# Patient Record
Sex: FEMALE | Race: WHITE
[De-identification: names, ages, dates, MRNs, and addresses within clinical notes are randomized per-mention and may not be internally consistent; named-entity substitution may affect disease eponyms.]

---

## 2018-01-24 ENCOUNTER — HOSPITAL ENCOUNTER (OUTPATIENT)
Dept: HOSPITAL 17 - CPRE | Age: 58
End: 2018-01-24
Attending: NEUROLOGICAL SURGERY
Payer: COMMERCIAL

## 2018-01-24 DIAGNOSIS — M43.16: ICD-10-CM

## 2018-01-24 DIAGNOSIS — Z01.812: Primary | ICD-10-CM

## 2018-01-24 DIAGNOSIS — M48.062: ICD-10-CM

## 2018-01-24 PROCEDURE — 87640 STAPH A DNA AMP PROBE: CPT

## 2018-01-24 PROCEDURE — 87641 MR-STAPH DNA AMP PROBE: CPT

## 2018-01-30 NOTE — MH
cc:

QUINCY NOBLES M.D., ROHIT K. M.D.

****

 

DATE OF ADMISSION:  2018

 

ADMITTING DIAGNOSIS:

Lumbar degenerative disk disease.

 

HISTORY OF PRESENT ILLNESS

This is a 57-year-old female who presented to us for evaluation of posterior

right thigh pain that started in the summer of  and low back pain.  She

states that she saw her primary care physician and felt that she had piriformis

syndrome and recommended exercises.  She states that she got worse and switched

primary care physician's and the new primary care physician recommended

physical therapy.  She was referred to physical therapy.  She started doing

exercises but this exacerbated her pain and her physical therapist recommended

that she follow up with her primary care physician, who felt that she had more

than just piriformis syndrome.

 

Her primary care physician at the time ordered an MRI to further evaluate her

complaints.  She was referred to neurosurgery for further evaluation.

 

She has intermittent numbness in the right leg in the same distribution as the

pain.  She gets cramps at night in her lower extremities.

 

She states that she is starting to have left hip pain and a similar type of

pain in the left leg.  She has chronic low back pain.  She denies any weakness

in the lower extremities.

 

She states when she is sitting or laying down the pain is 0/10.  When she is

more active during the day her pain reaches 7/10.

 

She states her pain is typically progressive and gets worse during the day.

 

PAST MEDICAL HISTORY:

1. Hypertension.

2. Hyperlipidemia.

3. Tonsillectomy in .

4. Uterine surgery in .

5. Left ovary removed in .

 

FAMILY HISTORY:

Father is  at 74 years old, had a history of diabetes and heart

disease.  He had a heart attack.  Mother  at 75 years old, had

hypertension.  Brother is alive at 61 years old, has hypertension.  Sister is

alive at 62 years old, has hypertension.

 

SOCIAL HISTORY:

She cleans houses.  She is .  She has one child.  She lives alone. She

does not smoke and quit in 2015.  She drinks 2-3 drinks of alcohol per

year.

 

CURRENT MEDICATIONS:

1. Tumeric 500 mg daily.

2. Aspirin 81 milligrams p.o. daily.  This was placed on hold prior to surgical

intervention.

3. Zolpidem 5 milligrams p.o. q hs.

4. Amitriptyline 25 milligrams p.o. q hs.

5. Losartan 100 milligrams p.o. daily

6. Pravastatin 20 milligrams p.o. daily.

7. Gabapentin 300 milligrams p.o. b.i.d.

8. Meloxicam 7.5 milligrams p.o. daily.  This was placed on hold prior to

surgical intervention.

9. Tramadol 50 milligrams p.o. q8 hours p.r.n. pain

10. Hydrochlorothiazide 25 milligrams p.o. daily.

11. Atenolol 100 milligrams p.o. daily

 

ALLERGIES

PENICILLIN.

 

REVIEW OF SYSTEMS

Constitutional: She denies any fever or chills.

EARS, NOSE, AND THROAT: No pharyngitis, exudate or blood draining from her

nose.

CARDIOVASCULAR: She denies any chest pain or palpitations.

RESPIRATORY: No cough or shortness of breath.

GENITOURINARY: No dysuria or hematuria.

MUSCULOSKELETAL: Positive for low back pain.

SKIN: No rash or pruritus.

NEUROLOGIC: No difficulty with speech or memory.

GASTROINTESTINAL: No nausea, vomiting, positive for abdominal pain.

PSYCHIATRIC: No anxiety or depression symptoms.

ENDOCRINE: Positive for polyuria, no polydipsia.

HEMATOLOGIC:  No bruising or bleeding tendencies.

 

PHYSICAL EXAMINATION:

Head:  Normocephalic, atraumatic.

Neck:  Supple.  No carotid bruits heard on auscultation.

Lungs:  Clear to auscultation bilaterally.

Heart:  Regular rate and rhythm, normal S1-S2.

Abdomen:  Soft, nontender.  Positive bowel sounds.

Skin:  Reveals no cyanosis or erythema.

Musculoskeletal:  She has 5/5 strength in lower extremities.  She ambulates

without any assistive device.

Neurologic:  She is awake, alert, oriented.  Cranial nerves II-XII grossly

intact.  Speech is fluent.  Comprehension is good.

 

DATA REVIEW

MRI of the lumbar spine from 2017 reveals a grade 1/2, L4-L5

spondylolisthesis with disk protrusion and facet hypertrophy with associated

significant spinal stenosis.  There is also degenerative disk disease to

moderate L5-S1 with disk protrusion eccentric to the left side and endplate

changes.  She has mild L2-L3, L3-L4 central disk protrusion along with disk

degeneration.

 

PLAN

We have discussed treatment options with the patient which include continued

conservative measures with physical therapy and pain management versus surgical

intervention.

 

The patient and her daughter are requesting that we proceed with surgical

intervention.  We have discussed a L4-L5 transforaminal decompression with

interbody fusion.  We have discussed the procedure using spine models in the

office.  We have discussed the risks involved with surgery to include but not

limited to bleeding, infection, muscle weakness, voice hoarseness, difficulty

swallowing, heart attack, stroke, blood clots, non fusion, scar tissue

formation among others.

 

The patient states that she understands the procedure as well as the risks

involved and she is requesting that we proceed.  She is therefore scheduled

accordingly.

 

 

Dictated by:  Barrington Jacobson PA-C

 

 

                               __________________________________

                           MD MICHEAL Chand/JUAN DIEGO

D:  2018/10:26 PM

T:  2018/10:47 PM

Visit #:  [id number]

Job #:  [id number]

## 2018-01-31 ENCOUNTER — HOSPITAL ENCOUNTER (INPATIENT)
Dept: HOSPITAL 17 - HSDI | Age: 58
LOS: 3 days | Discharge: NURSING, REHAB OR LONG-TERM CARE | DRG: 460 | End: 2018-02-03
Attending: NEUROLOGICAL SURGERY | Admitting: NEUROLOGICAL SURGERY
Payer: COMMERCIAL

## 2018-01-31 VITALS
OXYGEN SATURATION: 95 % | SYSTOLIC BLOOD PRESSURE: 112 MMHG | RESPIRATION RATE: 16 BRPM | TEMPERATURE: 97.5 F | DIASTOLIC BLOOD PRESSURE: 56 MMHG | HEART RATE: 80 BPM

## 2018-01-31 VITALS
RESPIRATION RATE: 18 BRPM | TEMPERATURE: 96.5 F | DIASTOLIC BLOOD PRESSURE: 74 MMHG | HEART RATE: 75 BPM | SYSTOLIC BLOOD PRESSURE: 125 MMHG | OXYGEN SATURATION: 97 %

## 2018-01-31 VITALS — BODY MASS INDEX: 28.72 KG/M2 | WEIGHT: 182.98 LBS | HEIGHT: 67 IN

## 2018-01-31 DIAGNOSIS — Z87.891: ICD-10-CM

## 2018-01-31 DIAGNOSIS — M43.16: ICD-10-CM

## 2018-01-31 DIAGNOSIS — M48.061: ICD-10-CM

## 2018-01-31 DIAGNOSIS — E78.5: ICD-10-CM

## 2018-01-31 DIAGNOSIS — M51.16: Primary | ICD-10-CM

## 2018-01-31 DIAGNOSIS — G89.29: ICD-10-CM

## 2018-01-31 DIAGNOSIS — I10: ICD-10-CM

## 2018-01-31 LAB
BASOPHILS # BLD AUTO: 0 TH/MM3 (ref 0–0.2)
BASOPHILS NFR BLD: 0 % (ref 0–2)
BUN SERPL-MCNC: 14 MG/DL (ref 7–18)
CALCIUM SERPL-MCNC: 8.2 MG/DL (ref 8.5–10.1)
CHLORIDE SERPL-SCNC: 101 MEQ/L (ref 98–107)
CREAT SERPL-MCNC: 0.67 MG/DL (ref 0.5–1)
EOSINOPHIL # BLD: 0 TH/MM3 (ref 0–0.4)
EOSINOPHIL NFR BLD: 0 % (ref 0–4)
ERYTHROCYTE [DISTWIDTH] IN BLOOD BY AUTOMATED COUNT: 13.5 % (ref 11.6–17.2)
GFR SERPLBLD BASED ON 1.73 SQ M-ARVRAT: 91 ML/MIN (ref 89–?)
GLUCOSE SERPL-MCNC: 153 MG/DL (ref 74–106)
HCO3 BLD-SCNC: 27.8 MEQ/L (ref 21–32)
HCT VFR BLD CALC: 35.9 % (ref 35–46)
HGB BLD-MCNC: 12.5 GM/DL (ref 11.6–15.3)
LYMPHOCYTES # BLD AUTO: 1 TH/MM3 (ref 1–4.8)
LYMPHOCYTES NFR BLD AUTO: 8.5 % (ref 9–44)
MCH RBC QN AUTO: 30 PG (ref 27–34)
MCHC RBC AUTO-ENTMCNC: 34.7 % (ref 32–36)
MCV RBC AUTO: 86.3 FL (ref 80–100)
MONOCYTE #: 0.2 TH/MM3 (ref 0–0.9)
MONOCYTES NFR BLD: 1.7 % (ref 0–8)
NEUTROPHILS # BLD AUTO: 10.6 TH/MM3 (ref 1.8–7.7)
NEUTROPHILS NFR BLD AUTO: 89.8 % (ref 16–70)
PLATELET # BLD: 223 TH/MM3 (ref 150–450)
PMV BLD AUTO: 8.4 FL (ref 7–11)
RBC # BLD AUTO: 4.16 MIL/MM3 (ref 4–5.3)
SODIUM SERPL-SCNC: 138 MEQ/L (ref 136–145)
WBC # BLD AUTO: 11.8 TH/MM3 (ref 4–11)

## 2018-01-31 PROCEDURE — 94150 VITAL CAPACITY TEST: CPT

## 2018-01-31 PROCEDURE — 80048 BASIC METABOLIC PNL TOTAL CA: CPT

## 2018-01-31 PROCEDURE — 72100 X-RAY EXAM L-S SPINE 2/3 VWS: CPT

## 2018-01-31 PROCEDURE — 86850 RBC ANTIBODY SCREEN: CPT

## 2018-01-31 PROCEDURE — 0SG00AJ FUSION OF LUMBAR VERTEBRAL JOINT WITH INTERBODY FUSION DEVICE, POSTERIOR APPROACH, ANTERIOR COLUMN, OPEN APPROACH: ICD-10-PCS | Performed by: NEUROLOGICAL SURGERY

## 2018-01-31 PROCEDURE — 86900 BLOOD TYPING SEROLOGIC ABO: CPT

## 2018-01-31 PROCEDURE — 76000 FLUOROSCOPY <1 HR PHYS/QHP: CPT

## 2018-01-31 PROCEDURE — C1713 ANCHOR/SCREW BN/BN,TIS/BN: HCPCS

## 2018-01-31 PROCEDURE — 86901 BLOOD TYPING SEROLOGIC RH(D): CPT

## 2018-01-31 PROCEDURE — 85025 COMPLETE CBC W/AUTO DIFF WBC: CPT

## 2018-01-31 PROCEDURE — 01NB0ZZ RELEASE LUMBAR NERVE, OPEN APPROACH: ICD-10-PCS | Performed by: NEUROLOGICAL SURGERY

## 2018-01-31 PROCEDURE — 0SB20ZZ EXCISION OF LUMBAR VERTEBRAL DISC, OPEN APPROACH: ICD-10-PCS | Performed by: NEUROLOGICAL SURGERY

## 2018-01-31 RX ADMIN — BACLOFEN SCH MG: 10 TABLET ORAL at 14:00

## 2018-01-31 RX ADMIN — ZOLPIDEM TARTRATE PRN MG: 5 TABLET, COATED ORAL at 20:47

## 2018-01-31 RX ADMIN — PHENYTOIN SODIUM SCH MLS/HR: 50 INJECTION INTRAMUSCULAR; INTRAVENOUS at 06:45

## 2018-01-31 RX ADMIN — SODIUM CHLORIDE AND POTASSIUM CHLORIDE SCH MLS/HR: 9; 1.49 INJECTION, SOLUTION INTRAVENOUS at 20:45

## 2018-01-31 RX ADMIN — GABAPENTIN SCH MG: 300 CAPSULE ORAL at 20:43

## 2018-01-31 RX ADMIN — AMITRIPTYLINE HYDROCHLORIDE SCH MG: 10 TABLET, FILM COATED ORAL at 20:43

## 2018-01-31 RX ADMIN — PHENYTOIN SODIUM SCH MLS/HR: 50 INJECTION INTRAMUSCULAR; INTRAVENOUS at 20:45

## 2018-01-31 RX ADMIN — HYDROCODONE BITARTRATE AND ACETAMINOPHEN PRN TAB: 10; 325 TABLET ORAL at 18:23

## 2018-01-31 RX ADMIN — VANCOMYCIN HYDROCHLORIDE SCH MLS/HR: 1 INJECTION, SOLUTION INTRAVENOUS at 20:44

## 2018-01-31 RX ADMIN — LOSARTAN POTASSIUM SCH MG: 50 TABLET, FILM COATED ORAL at 20:43

## 2018-01-31 RX ADMIN — STANDARDIZED SENNA CONCENTRATE AND DOCUSATE SODIUM SCH TAB: 8.6; 5 TABLET, FILM COATED ORAL at 20:44

## 2018-01-31 RX ADMIN — SODIUM CHLORIDE AND POTASSIUM CHLORIDE SCH MLS/HR: 9; 1.49 INJECTION, SOLUTION INTRAVENOUS at 15:38

## 2018-01-31 RX ADMIN — BACLOFEN SCH MG: 10 TABLET ORAL at 20:43

## 2018-01-31 RX ADMIN — Medication SCH ML: at 20:44

## 2018-01-31 NOTE — RADRPT
EXAM DATE/TIME:  01/31/2018 08:50 

 

[facility] COMPARISON:     

No previous studies available for comparison.

 

                     

INDICATIONS :     

Post-op L4-L5 posterior lumbar fusion.

                     

 

MEDICAL HISTORY :     

None.          

 

SURGICAL HISTORY :     

None.   

 

ENCOUNTER:     

Initial                                        

 

ACUITY:     

1 day      

 

PAIN SCORE:     

Non-responsive.

 

LOCATION:       

Lumbar spine.

 

 

CONCLUSION:     

Fluoroscopic images during placement of screws and a karlo on the right at L4-5. Intervertebral disc de
vice also seen

 

 

 

 Barrington Coleman MD on January 31, 2018 at 14:14           

Board Certified Radiologist.

 This report was verified electronically.

## 2018-01-31 NOTE — PD.OP
Leo Martínez MD


__________________________________________________





Operative Report


Date of Surgery:  Jan 31, 2018


Preoperative Diagnosis:  


Intractable low back pain with radiculopathy; L4-5 degenerative disc disease 

with the isthmic spondylolisthesis along with disc protrusion and facet 

hypertrophy with spinal and foraminal stenosis


Postoperative Diagnosis:  


Same


Procedure:


Lumbar L4-5 transforaminal interbody fusion; L4-5 decompressive laminectomy 

with discectomy and foraminotomy; L4-5 pedicle screw fixation; L4-5 interbody 

cage placement; microsurgical technique


Anesthesia:


Gen. endotracheal by Sd grewal


Surgeon:


Julio César Meyer M.D.


Assistant(s):


Nimco Ayala


Operation and Findings:


Following initiation of general endotracheal anesthesia, the patient had a 

Bosch catheter placed along with sequential compression devices.  A gram of 

vancomycin was administered intravenously and she was turned in a prone 

position on a Austin frame, on a Checo table, and all pressure points 

adequately padded.  The lumbosacral region was then prepped with Chloraprep and 

sterilely draped with Ioban along the usual sterile draping. A midline skin 

incision was then made extending from the L4-L5 level after infiltrating the 

skin with 0.5% Marcaine with epinephrine solution extending down through the 

fascia. The muscle fibers were split using avascular fatty plane and detached 

from the underlying facets, transverse process and lateral portion of lamina on 

the right side and a self-retaining retractor used for exposure. Intraoperative 

fluoroscopy was also used for level of confirmation along with microscope 

magnification for further dissection. There was significant facet and 

ligamentum flavum hypertrophy noted  at the L4-5 levels along with the pars 

defect and spondylolisthesis.  Right L4-5 facet was resected with a drill bit 

along with the lamina and there was severe foraminal and lateral recess 

stenosis from hypertrophied ligamentum flavum and facet which were decompressed 

bilaterally through the unilateral approach. There was significant disc height 

collapse along with disc protrusion also leading to the foraminal stenosis as 

well as a disc herniation.  Epidural hemostasis was achieved with bipolar 

cautery and Gelfoam with thrombin.  Subsequently entered into the disc space at 

the L4-5 level with a #15 blade and ever were used for discectomy. I then 

placed PEEK  cage packed with local autograft bone and more local autograft 

bone was packed adjacent to the cage in interspace for added interbody fusion.  

With placement of the cage, I was able to distract the interspace and opened up 

the  foramen further bilaterally.  Subsequently in order to facilitate the 

fusion and provide stabilization, pedicle screw fixation was undertaken using 

[place] spine screws on entry point at the right L4-5 levels at the junction of 

the transverse process and facet.  Subsequently using AP and lateral 

fluoroscopy tap and screw placement.  The screws were then connected with a karlo 

and locked in place with caps.  The construct appeared very secure at this 

point.  The area was then copiously irrigated with Vancomycin solution and 

powder.  The retractors were removed and the bipolar cautery used for 

hemostasis.  The muscle fascia was then approximated using 2-0 Vicryl 

interrupted stitches and then 3-0 Vicryl subcuticular stitches also placed in 

interrupted fashion. The final skin closure was completed with Mastisol and 

Steri-Strips. A sterile dressing was then applied.  The patient then turned in 

supine position, extubated and taken to recovery room.  There were no 

intraoperative complications.  All sponge and needle counts were correct at the 

end of procedure.  Estimated blood loss about 50 ml.











Julio César Meyer MD Jan 31, 2018 14:06

## 2018-02-01 VITALS
SYSTOLIC BLOOD PRESSURE: 125 MMHG | DIASTOLIC BLOOD PRESSURE: 73 MMHG | TEMPERATURE: 96.9 F | RESPIRATION RATE: 18 BRPM | OXYGEN SATURATION: 97 % | HEART RATE: 71 BPM

## 2018-02-01 VITALS
TEMPERATURE: 97.5 F | SYSTOLIC BLOOD PRESSURE: 93 MMHG | RESPIRATION RATE: 17 BRPM | OXYGEN SATURATION: 94 % | DIASTOLIC BLOOD PRESSURE: 49 MMHG | HEART RATE: 67 BPM

## 2018-02-01 VITALS
HEART RATE: 72 BPM | OXYGEN SATURATION: 95 % | RESPIRATION RATE: 18 BRPM | DIASTOLIC BLOOD PRESSURE: 58 MMHG | TEMPERATURE: 97.1 F | SYSTOLIC BLOOD PRESSURE: 125 MMHG

## 2018-02-01 VITALS
SYSTOLIC BLOOD PRESSURE: 97 MMHG | TEMPERATURE: 97.5 F | DIASTOLIC BLOOD PRESSURE: 51 MMHG | HEART RATE: 74 BPM | RESPIRATION RATE: 18 BRPM | OXYGEN SATURATION: 94 %

## 2018-02-01 VITALS
OXYGEN SATURATION: 97 % | SYSTOLIC BLOOD PRESSURE: 119 MMHG | HEART RATE: 72 BPM | DIASTOLIC BLOOD PRESSURE: 65 MMHG | TEMPERATURE: 97 F | RESPIRATION RATE: 12 BRPM

## 2018-02-01 VITALS
OXYGEN SATURATION: 98 % | TEMPERATURE: 97.6 F | SYSTOLIC BLOOD PRESSURE: 108 MMHG | HEART RATE: 68 BPM | DIASTOLIC BLOOD PRESSURE: 61 MMHG | RESPIRATION RATE: 17 BRPM

## 2018-02-01 VITALS — OXYGEN SATURATION: 98 %

## 2018-02-01 RX ADMIN — BACLOFEN SCH MG: 10 TABLET ORAL at 05:57

## 2018-02-01 RX ADMIN — ZOLPIDEM TARTRATE PRN MG: 5 TABLET, COATED ORAL at 21:12

## 2018-02-01 RX ADMIN — HYDROCODONE BITARTRATE AND ACETAMINOPHEN PRN TAB: 10; 325 TABLET ORAL at 17:41

## 2018-02-01 RX ADMIN — Medication SCH ML: at 21:00

## 2018-02-01 RX ADMIN — SODIUM CHLORIDE AND POTASSIUM CHLORIDE SCH MLS/HR: 9; 1.49 INJECTION, SOLUTION INTRAVENOUS at 19:37

## 2018-02-01 RX ADMIN — CYCLOBENZAPRINE HYDROCHLORIDE SCH MG: 10 TABLET, FILM COATED ORAL at 15:04

## 2018-02-01 RX ADMIN — LOSARTAN POTASSIUM SCH MG: 50 TABLET, FILM COATED ORAL at 21:00

## 2018-02-01 RX ADMIN — AMITRIPTYLINE HYDROCHLORIDE SCH MG: 10 TABLET, FILM COATED ORAL at 21:12

## 2018-02-01 RX ADMIN — ATENOLOL SCH MG: 100 TABLET ORAL at 08:33

## 2018-02-01 RX ADMIN — HYDROCODONE BITARTRATE AND ACETAMINOPHEN PRN TAB: 10; 325 TABLET ORAL at 22:55

## 2018-02-01 RX ADMIN — HYDROCODONE BITARTRATE AND ACETAMINOPHEN PRN TAB: 10; 325 TABLET ORAL at 08:32

## 2018-02-01 RX ADMIN — HYDROCHLOROTHIAZIDE SCH MG: 25 TABLET ORAL at 08:33

## 2018-02-01 RX ADMIN — HYDROCODONE BITARTRATE AND ACETAMINOPHEN PRN TAB: 10; 325 TABLET ORAL at 13:23

## 2018-02-01 RX ADMIN — GABAPENTIN SCH MG: 300 CAPSULE ORAL at 08:33

## 2018-02-01 RX ADMIN — CYCLOBENZAPRINE HYDROCHLORIDE SCH MG: 10 TABLET, FILM COATED ORAL at 22:55

## 2018-02-01 RX ADMIN — GABAPENTIN SCH MG: 300 CAPSULE ORAL at 21:12

## 2018-02-01 RX ADMIN — STANDARDIZED SENNA CONCENTRATE AND DOCUSATE SODIUM SCH TAB: 8.6; 5 TABLET, FILM COATED ORAL at 08:33

## 2018-02-01 RX ADMIN — Medication SCH ML: at 08:34

## 2018-02-01 RX ADMIN — ASPIRIN SCH MG: 81 TABLET ORAL at 08:33

## 2018-02-01 RX ADMIN — HYDROCODONE BITARTRATE AND ACETAMINOPHEN PRN TAB: 10; 325 TABLET ORAL at 02:57

## 2018-02-01 RX ADMIN — STANDARDIZED SENNA CONCENTRATE AND DOCUSATE SODIUM SCH TAB: 8.6; 5 TABLET, FILM COATED ORAL at 21:12

## 2018-02-01 RX ADMIN — PANTOPRAZOLE SCH MG: 40 TABLET, DELAYED RELEASE ORAL at 08:33

## 2018-02-01 RX ADMIN — SODIUM CHLORIDE AND POTASSIUM CHLORIDE SCH MLS/HR: 9; 1.49 INJECTION, SOLUTION INTRAVENOUS at 02:58

## 2018-02-01 RX ADMIN — PRAVASTATIN SODIUM SCH MG: 20 TABLET ORAL at 08:33

## 2018-02-01 RX ADMIN — VANCOMYCIN HYDROCHLORIDE SCH MLS/HR: 1 INJECTION, SOLUTION INTRAVENOUS at 08:33

## 2018-02-01 NOTE — HHI.NSPN
__________________________________________________


 (Barrington Jacobson)





History


Chief Complaint:  Incisional pain.


 (Barrington Jacobson)


Interval History


2/1/18:  Pt s/p lumbar L4-5 transforaminal interbody fusion; L4-5 decompressive 

laminectomy with discectomy and foraminotomy; L4-5 pedicle screw fixation; L4-5 

interbody cage placement on 1/31/18.  She complains of incisional pain but 

controlled with medication.  No radiculopathy in LEs.  No paresthesias in LEs.  

Moves LEs with good strength.


 (Barrington Jacobson)





Review of Systems


General:  Negative for: fever, chills, insomnia


Respiratory:  Negative for: shortness of breath, cough, sputum


Cardiovascular:  Negative for: chest pain


Gastrointestinal:  Negative for: nausea, vomitting, diarrhea, constipation (

Barrington Jacobson)





Exam


Results





Vital Signs








  Date Time  Temp Pulse Resp B/P (MAP) Pulse Ox O2 Delivery O2 Flow Rate FiO2


 


2/1/18 07:52 97.1 72 18 125/58 (80) 95   


 


1/31/18 17:45      Nasal Cannula 3 














Intake and Output   


 


 2/1/18 2/1/18 2/2/18





 08:00 16:00 00:00


 


Intake Total 1300 ml 480 ml 


 


Output Total 1500 ml 1000 ml 


 


Balance -200 ml -520 ml 








 (Barrington Jacobson)


Physical Examination


General:  Pt awake and alert and resting comfortably in bed.


Resp:  CTA bilaterally


Heart:  NSR no murmurs


Abd:  Soft positive bs


Skin:  No cyanosis or erythema.  Bandage changed by RN early this am.


Muscle:  Moves LEs with good strength 5/5.


Neuro:  Pt awake and alert.  Follows commands well.  Speech clear and 

appropriate.


 (Barrington Jacobson)


Lab, Micro, Other Results





Last Impressions








Lumbar Spine X-Ray 1/31/18 0000 Signed





Impressions: 





 Service Date/Time:  Wednesday, January 31, 2018 08:50 - CONCLUSION:  





 Fluoroscopic images during placement of screws and a karlo on the right at L4-5. 





 Intervertebral disc device also seen     Barrington Coleman MD 








Laboratory Tests








Test


  1/31/18


15:25


 


White Blood Count 11.8 TH/MM3 


 


Red Blood Count 4.16 MIL/MM3 


 


Hemoglobin 12.5 GM/DL 


 


Hematocrit 35.9 % 


 


Mean Corpuscular Volume 86.3 FL 


 


Mean Corpuscular Hemoglobin 30.0 PG 


 


Mean Corpuscular Hemoglobin


Concent 34.7 % 


 


 


Red Cell Distribution Width 13.5 % 


 


Platelet Count 223 TH/MM3 


 


Mean Platelet Volume 8.4 FL 


 


Neutrophils (%) (Auto) 89.8 % 


 


Lymphocytes (%) (Auto) 8.5 % 


 


Monocytes (%) (Auto) 1.7 % 


 


Eosinophils (%) (Auto) 0.0 % 


 


Basophils (%) (Auto) 0.0 % 


 


Neutrophils # (Auto) 10.6 TH/MM3 


 


Lymphocytes # (Auto) 1.0 TH/MM3 


 


Monocytes # (Auto) 0.2 TH/MM3 


 


Eosinophils # (Auto) 0.0 TH/MM3 


 


Basophils # (Auto) 0.0 TH/MM3 


 


CBC Comment DIFF FINAL 


 


Differential Comment  


 


Blood Urea Nitrogen 14 MG/DL 


 


Creatinine 0.67 MG/DL 


 


Random Glucose 153 MG/DL 


 


Calcium Level 8.2 MG/DL 


 


Sodium Level 138 MEQ/L 


 


Potassium Level 3.6 MEQ/L 


 


Chloride Level 101 MEQ/L 


 


Carbon Dioxide Level 27.8 MEQ/L 


 


Anion Gap 9 MEQ/L 


 


Estimat Glomerular Filtration


Rate 91 ML/MIN 


 














 2/1/18 2/1/18 2/2/18





 15:00 23:00 07:00


 


Intake Total 480 ml  


 


Output Total 1000 ml  


 


Balance -520 ml  


 


   


 


Intake Oral 480 ml  


 


Output Urine Total 1000 ml  








 (Barrington Jacobson)





Medical Decision Making


Impression and Plan


A:  58 y/o FM s/p lumbar L4-5 transforaminal interbody fusion; L4-5 

decompressive laminectomy with discectomy and foraminotomy; L4-5 pedicle screw 

fixation; L4-5 interbody cage placement by Dr. Meyer on 2/1/18.





P:  Continue to monitor


     Continue with rehab efforts.


     Continue with current care


 (Barrington Jacobson)





Attending Statement


The exam, history, and the medical decision-making described in the above note 

were completed with the assistance of the mid-level provider. I reviewed and 

agree with the findings presented.  I attest that I had a face-to-face 

encounter with the patient on the same day, and personally performed and 

documented my assessment and findings in the medical record.


 (Julio César Meyer MD)











Barrington Jacobson Feb 1, 2018 09:20


Julio César Meyer MD Feb 1, 2018 13:50

## 2018-02-02 VITALS
TEMPERATURE: 98.7 F | SYSTOLIC BLOOD PRESSURE: 133 MMHG | DIASTOLIC BLOOD PRESSURE: 74 MMHG | HEART RATE: 82 BPM | RESPIRATION RATE: 18 BRPM | OXYGEN SATURATION: 98 %

## 2018-02-02 VITALS
OXYGEN SATURATION: 98 % | RESPIRATION RATE: 20 BRPM | SYSTOLIC BLOOD PRESSURE: 138 MMHG | HEART RATE: 80 BPM | TEMPERATURE: 99.2 F | DIASTOLIC BLOOD PRESSURE: 80 MMHG

## 2018-02-02 VITALS
HEART RATE: 77 BPM | SYSTOLIC BLOOD PRESSURE: 131 MMHG | RESPIRATION RATE: 18 BRPM | TEMPERATURE: 97.7 F | DIASTOLIC BLOOD PRESSURE: 73 MMHG | OXYGEN SATURATION: 94 %

## 2018-02-02 VITALS
TEMPERATURE: 99.2 F | SYSTOLIC BLOOD PRESSURE: 109 MMHG | RESPIRATION RATE: 14 BRPM | HEART RATE: 72 BPM | OXYGEN SATURATION: 96 % | DIASTOLIC BLOOD PRESSURE: 59 MMHG

## 2018-02-02 VITALS
DIASTOLIC BLOOD PRESSURE: 55 MMHG | OXYGEN SATURATION: 94 % | TEMPERATURE: 97.6 F | HEART RATE: 76 BPM | SYSTOLIC BLOOD PRESSURE: 97 MMHG | RESPIRATION RATE: 18 BRPM

## 2018-02-02 VITALS — OXYGEN SATURATION: 94 %

## 2018-02-02 RX ADMIN — PHENYTOIN SODIUM SCH MLS/HR: 50 INJECTION INTRAMUSCULAR; INTRAVENOUS at 06:45

## 2018-02-02 RX ADMIN — GABAPENTIN SCH MG: 300 CAPSULE ORAL at 20:59

## 2018-02-02 RX ADMIN — STANDARDIZED SENNA CONCENTRATE AND DOCUSATE SODIUM SCH TAB: 8.6; 5 TABLET, FILM COATED ORAL at 21:00

## 2018-02-02 RX ADMIN — CYCLOBENZAPRINE HYDROCHLORIDE SCH MG: 10 TABLET, FILM COATED ORAL at 07:00

## 2018-02-02 RX ADMIN — HYDROCODONE BITARTRATE AND ACETAMINOPHEN PRN TAB: 10; 325 TABLET ORAL at 08:24

## 2018-02-02 RX ADMIN — HYDROCODONE BITARTRATE AND ACETAMINOPHEN PRN TAB: 10; 325 TABLET ORAL at 21:00

## 2018-02-02 RX ADMIN — ASPIRIN SCH MG: 81 TABLET ORAL at 08:23

## 2018-02-02 RX ADMIN — AMITRIPTYLINE HYDROCHLORIDE SCH MG: 10 TABLET, FILM COATED ORAL at 21:00

## 2018-02-02 RX ADMIN — CYCLOBENZAPRINE HYDROCHLORIDE SCH MG: 10 TABLET, FILM COATED ORAL at 21:00

## 2018-02-02 RX ADMIN — CYCLOBENZAPRINE HYDROCHLORIDE SCH MG: 10 TABLET, FILM COATED ORAL at 14:15

## 2018-02-02 RX ADMIN — ZOLPIDEM TARTRATE PRN MG: 5 TABLET, COATED ORAL at 21:00

## 2018-02-02 RX ADMIN — PANTOPRAZOLE SCH MG: 40 TABLET, DELAYED RELEASE ORAL at 08:23

## 2018-02-02 RX ADMIN — PRAVASTATIN SODIUM SCH MG: 20 TABLET ORAL at 08:23

## 2018-02-02 RX ADMIN — Medication SCH ML: at 21:00

## 2018-02-02 RX ADMIN — HYDROCODONE BITARTRATE AND ACETAMINOPHEN PRN TAB: 10; 325 TABLET ORAL at 14:15

## 2018-02-02 RX ADMIN — HYDROCHLOROTHIAZIDE SCH MG: 25 TABLET ORAL at 08:23

## 2018-02-02 RX ADMIN — LOSARTAN POTASSIUM SCH MG: 50 TABLET, FILM COATED ORAL at 21:00

## 2018-02-02 RX ADMIN — SODIUM CHLORIDE AND POTASSIUM CHLORIDE SCH MLS/HR: 9; 1.49 INJECTION, SOLUTION INTRAVENOUS at 05:37

## 2018-02-02 RX ADMIN — ATENOLOL SCH MG: 100 TABLET ORAL at 08:23

## 2018-02-02 RX ADMIN — STANDARDIZED SENNA CONCENTRATE AND DOCUSATE SODIUM SCH TAB: 8.6; 5 TABLET, FILM COATED ORAL at 08:23

## 2018-02-02 RX ADMIN — Medication SCH ML: at 08:38

## 2018-02-02 RX ADMIN — GABAPENTIN SCH MG: 300 CAPSULE ORAL at 08:24

## 2018-02-02 NOTE — HHI.NSPN
__________________________________________________


 (Barrington Jacobson)





History


Chief Complaint:  Incisional pain.





 (Barrington Jacobson)


Interval History


2/1/18:  Pt s/p lumbar L4-5 transforaminal interbody fusion; L4-5 decompressive 

laminectomy with discectomy and foraminotomy; L4-5 pedicle screw fixation; L4-5 

interbody cage placement on 1/31/18.  She complains of incisional pain but 

controlled with medication.  No radiculopathy in LEs.  No paresthesias in LEs.  

Moves LEs with good strength.


2/2/18:  Pt awake and alert. Complains of incisional pain worse with movement.  

No radiculopathy or paresthesias in LEs.  Bosch was just removed and pt has not 

urinated yet.


 (Barrington Jacobson)





Review of Systems


General:  Negative for: fever, chills, insomnia


Respiratory:  Negative for: shortness of breath, cough, sputum


Cardiovascular:  Negative for: chest pain


Gastrointestinal:  Negative for: nausea, vomitting, diarrhea, constipation (

Barrington Jacobson)





Exam


Results





Vital Signs








  Date Time  Temp Pulse Resp B/P (MAP) Pulse Ox O2 Delivery O2 Flow Rate FiO2


 


2/2/18 08:00 98.7 82 18 133/74 (93) 98   


 


1/31/18 17:45      Nasal Cannula 3 














Intake and Output   


 


 2/2/18 2/2/18 2/3/18





 08:00 16:00 00:00


 


Intake Total 240 ml  


 


Output Total 300 ml  


 


Balance -60 ml  








 (Barrington Jacobson)


Physical Examination


General:  Pt awake and alert and resting comfortably in bed.


Resp:  CTA bilaterally


Heart:  NSR no murmurs


Abd:  Soft positive bs


Skin:  No cyanosis or erythema.  Pt log rolled and incision is clean and dry 

without any signs of infection or complication.  Steri strips in place and new 

bandage applied.


Muscle:  Moves LEs with good strength 5/5.


Neuro:  Pt awake and alert.  Follows commands well.  Speech clear and 

appropriate.  Sensation intact in LEs. 


 (Barrington Jacobson)


Lab, Micro, Other Results





Last Impressions








Lumbar Spine X-Ray 1/31/18 0000 Signed





Impressions: 





 Service Date/Time:  Wednesday, January 31, 2018 08:50 - CONCLUSION:  





 Fluoroscopic images during placement of screws and a karlo on the right at L4-5. 





 Intervertebral disc device also seen     Barrington Coleman MD 








 (Barrington Jacobson)





Medical Decision Making


Impression and Plan


A:  58 y/o FM s/p lumbar L4-5 transforaminal interbody fusion; L4-5 

decompressive laminectomy with discectomy and foraminotomy; L4-5 pedicle screw 

fixation; L4-5 interbody cage placement by Dr. Meyer on 2/1/18.





P:  Continue to monitor


     Continue with rehab efforts.


     Continue with current care 


     Anticipate rehab placement 2/3/18 am.


 (Barrington Jacobson)





Attending Statement


The exam, history, and the medical decision-making described in the above note 

were completed with the assistance of the mid-level provider. I reviewed and 

agree with the findings presented.  I attest that I had a face-to-face 

encounter with the patient on the same day, and personally performed and 

documented my assessment and findings in the medical record.


 (Julio César Meyer MD)











Barrington Jacobson Feb 2, 2018 10:23


Julio César Meyer MD Feb 2, 2018 15:14

## 2018-02-03 VITALS
TEMPERATURE: 99.3 F | HEART RATE: 79 BPM | RESPIRATION RATE: 17 BRPM | SYSTOLIC BLOOD PRESSURE: 121 MMHG | DIASTOLIC BLOOD PRESSURE: 72 MMHG | OXYGEN SATURATION: 98 %

## 2018-02-03 VITALS
RESPIRATION RATE: 17 BRPM | DIASTOLIC BLOOD PRESSURE: 72 MMHG | HEART RATE: 77 BPM | OXYGEN SATURATION: 97 % | SYSTOLIC BLOOD PRESSURE: 116 MMHG | TEMPERATURE: 97.4 F

## 2018-02-03 VITALS
HEART RATE: 82 BPM | SYSTOLIC BLOOD PRESSURE: 124 MMHG | RESPIRATION RATE: 17 BRPM | TEMPERATURE: 98.8 F | OXYGEN SATURATION: 95 % | DIASTOLIC BLOOD PRESSURE: 83 MMHG

## 2018-02-03 VITALS
RESPIRATION RATE: 20 BRPM | OXYGEN SATURATION: 96 % | TEMPERATURE: 98.6 F | SYSTOLIC BLOOD PRESSURE: 104 MMHG | HEART RATE: 82 BPM | DIASTOLIC BLOOD PRESSURE: 58 MMHG

## 2018-02-03 RX ADMIN — HYDROCHLOROTHIAZIDE SCH MG: 25 TABLET ORAL at 10:16

## 2018-02-03 RX ADMIN — STANDARDIZED SENNA CONCENTRATE AND DOCUSATE SODIUM SCH TAB: 8.6; 5 TABLET, FILM COATED ORAL at 10:16

## 2018-02-03 RX ADMIN — ASPIRIN SCH MG: 81 TABLET ORAL at 10:16

## 2018-02-03 RX ADMIN — ATENOLOL SCH MG: 100 TABLET ORAL at 10:16

## 2018-02-03 RX ADMIN — HYDROCODONE BITARTRATE AND ACETAMINOPHEN PRN TAB: 10; 325 TABLET ORAL at 10:17

## 2018-02-03 RX ADMIN — HYDROCODONE BITARTRATE AND ACETAMINOPHEN PRN TAB: 10; 325 TABLET ORAL at 05:43

## 2018-02-03 RX ADMIN — PANTOPRAZOLE SCH MG: 40 TABLET, DELAYED RELEASE ORAL at 10:16

## 2018-02-03 RX ADMIN — CYCLOBENZAPRINE HYDROCHLORIDE SCH MG: 10 TABLET, FILM COATED ORAL at 15:47

## 2018-02-03 RX ADMIN — Medication SCH ML: at 10:23

## 2018-02-03 RX ADMIN — HYDROCODONE BITARTRATE AND ACETAMINOPHEN PRN TAB: 10; 325 TABLET ORAL at 15:48

## 2018-02-03 RX ADMIN — GABAPENTIN SCH MG: 300 CAPSULE ORAL at 10:16

## 2018-02-03 RX ADMIN — PHENYTOIN SODIUM SCH MLS/HR: 50 INJECTION INTRAMUSCULAR; INTRAVENOUS at 06:45

## 2018-02-03 RX ADMIN — CYCLOBENZAPRINE HYDROCHLORIDE SCH MG: 10 TABLET, FILM COATED ORAL at 05:43

## 2018-02-03 RX ADMIN — PRAVASTATIN SODIUM SCH MG: 20 TABLET ORAL at 09:00

## 2018-02-03 NOTE — HHI.NSPN
__________________________________________________


 (Kalina Pang)





Note Status


Status:  Progress Note


 (Kalina Pang)





Interval History


Interval History


2/1/18:  Pt s/p lumbar L4-5 transforaminal interbody fusion; L4-5 decompressive 

laminectomy with discectomy and foraminotomy; L4-5 pedicle screw fixation; L4-5 

interbody cage placement on 1/31/18.  She complains of incisional pain but 

controlled with medication.  No radiculopathy in LEs.  No paresthesias in LEs.  

Moves LEs with good strength.


2/2/18:  Pt awake and alert. Complains of incisional pain worse with movement.  

No radiculopathy or paresthesias in LEs.  Bosch was just removed and pt has not 

urinated yet.


 





2/3: reports to be doing well, surgical pain controlled, reports no symptoms to 

LE's, she is ambulating with RW, she feels ready for dc to rehab today. 


 (Kalina Pang)





Labs, Micro, & Vital Signs


Results











  Date Time  Temp Pulse Resp B/P (MAP) Pulse Ox O2 Delivery O2 Flow Rate FiO2


 


2/3/18 08:00 97.4 77 17 116/72 (87) 97   


 


2/3/18 04:00 98.6 82 20 104/58 (73) 96   


 


2/2/18 20:00 99.2 80 20 138/80 (99) 98   


 


2/2/18 17:26     94   21


 


2/2/18 16:00 97.7 77 18 131/73 (92) 94   








Constitutional





Vital Signs








  Date Time  Temp Pulse Resp B/P (MAP) Pulse Ox O2 Delivery O2 Flow Rate FiO2


 


2/3/18 08:00 97.4 77 17 116/72 (87) 97   


 


2/3/18 04:00 98.6 82 20 104/58 (73) 96   


 


2/2/18 20:00 99.2 80 20 138/80 (99) 98   


 


2/2/18 17:26     94   21


 


2/2/18 16:00 97.7 77 18 131/73 (92) 94   








 (Kalina Pang)





Review of Systems


Constitutional:  DENIES: Fever, Chills


Respiratory:  DENIES: Hemoptysis, Shortness of breath


Cardiovascular:  DENIES: Chest pain


Neurologic:  DENIES: Localized weakness (Kalina Pang)





Physical Exam


Ms. Leavitt awake and alert and resting comfortably in bed.


Speech is appropriate with accent.


Motor: moves all four extremities well and symmetrically


Skin:  No cyanosis or erythema.  


Resp: clear, no wheezing


 (Kalina Pang)





Medications


Current Medications





Current Medications








 Medications


  (Trade)  Dose


 Ordered  Sig/Luz Maria


 Route


 PRN Reason  Start Time


 Stop Time Status Last Admin


Dose Admin


 


 Sodium Chloride  1,000 ml @ 


 30 mls/hr  Q24H


 IV


   1/31/18 06:45


     


 


 


 Amitriptyline HCl


  (Elavil)  10 mg  HS


 PO


   1/31/18 21:00


    2/2/18 21:00


 


 


 Aspirin


  (Ecotrin Ec)  81 mg  DAILY


 PO


   2/1/18 09:00


    2/3/18 10:16


 


 


 Atenolol


  (Tenormin)  100 mg  DAILY


 PO


   2/1/18 09:00


    2/3/18 10:16


 


 


 Gabapentin


  (Neurontin)  300 mg  BID


 PO


   1/31/18 21:00


    2/3/18 10:16


 


 


 Hydrochlorothiazide


  (Hydrodiuril)  25 mg  DAILY


 PO


   2/1/18 09:00


    2/3/18 10:16


 


 


 Losartan Potassium


  (Cozaar)  100 mg  HS


 PO


   1/31/18 21:00


    1/31/18 20:43


 


 


 Pravastatin Sodium


  (Pravachol)  20 mg  DAILY


 PO


   2/1/18 09:00


    2/2/18 08:23


 


 


 Zolpidem Tartrate


  (Ambien)  5 mg  HS  PRN


 PO


 INSOMNIA  1/31/18 21:00


    2/2/18 21:00


 


 


 Sodium Chloride


  (NS Flush)  2 ml  UNSCH  PRN


 IV FLUSH


 FLUSH AFTER USING IV ACCESS  1/31/18 13:45


     


 


 


 Sodium Chloride


  (NS Flush)  2 ml  BID


 IV FLUSH


   1/31/18 21:00


    2/3/18 10:23


 


 


 Al Hydrox/Mg


 Hydrox/Simethicone


  (Mag-Al Plus


 Susp Liq)  30 ml  Q6H  PRN


 PO


 DYSPEPSIA  1/31/18 15:00


     


 


 


 Pantoprazole


 Sodium


  (Protonix)  40 mg  DAILY


 PO


   2/1/18 09:00


    2/3/18 10:16


 


 


 Ondansetron HCl


  (Zofran Inj)  4 mg  Q6H  PRN


 IV PUSH


 NAUSEA OR VOMITING  1/31/18 15:00


     


 


 


 Promethazine HCl


  (Phenergan Inj)  25 mg  Q4H  PRN


 IM


 NAUSEA OR VOMITING  1/31/18 15:00


     


 


 


 Calcium Gluconate


 1 gm/Sodium


 Chloride  110 ml @ 


 110 mls/hr  UNSCH  PRN


 IV


 SEE LABEL COMMENTS  1/31/18 13:45


     


 


 


 Potassium Chloride  100 ml @ 


 50 mls/hr  UNSCH  PRN


 IV


 POTASSIUM LESS THAN 4  1/31/18 13:45


     


 


 


 Magnesium Sulfate


 2 gm/Sodium


 Chloride  104 ml @ 


 100 mls/hr  UNSCH  PRN


 IV


 MAGNESIUM LESS THAN 2  1/31/18 13:45


     


 


 


 Acetaminophen/


 Hydrocodone Bitart


  (Norco  Mg)  1 tab  Q4H  PRN


 PO


 PAIN SCALE 1 TO 5  1/31/18 15:00


    2/3/18 10:17


 


 


 Acetaminophen/


 Hydrocodone Bitart


  (Norco  Mg)  2 tab  Q4H  PRN


 PO


 PAIN SCALE 6 TO 10  1/31/18 15:00


    2/1/18 17:41


 


 


 Morphine Sulfate


  (Morphine Inj)  4 mg  Q2H  PRN


 IV PUSH


 breakthrough pain 7 TO 10  1/31/18 15:00


     


 


 


 Clonidine


  (Catapres)  0.1 mg  Q6H  PRN


 PO


 SYS BP GREATER THAN 170 MMHG  1/31/18 15:00


     


 


 


 Acetaminophen


  (Tylenol)  650 mg  Q4H  PRN


 PO


 TEMPERATURE > 101.5 F  1/31/18 15:00


     


 


 


 Menthol


  (Halls Ivone)  1 lozenge  UNSCH  PRN


 BUCCAL


 SORE THROAT  1/31/18 13:45


     


 


 


 Albuterol Sulfate


  (Albuterol Neb)  2.5 mg  Q4HR NEB  PRN


 NEB


 WHEEZING  1/31/18 16:00


     


 


 


 Senna/Docusate


 Sodium


  (Tabatha-Colace)  1 tab  BID


 PO


   1/31/18 21:00


    2/3/18 10:16


 


 


 Magnesium


 Hydroxide


  (Milk Of


 Magnesia Liq)  30 ml  Q12HR  PRN


 PO


 Mild constipation  1/31/18 15:00


     


 


 


 Sennosides


  (Senokot)  17.2 mg  Q12HR  PRN


 PO


 Moderate constipation  1/31/18 21:00


     


 


 


 Bisacodyl


  (Dulcolax Supp)  10 mg  DAILY  PRN


 RECTAL


 SEVERE CONSITIPATION  1/31/18 13:45


     


 


 


 Lactulose


  (Lactulose Liq)  30 ml  DAILY  PRN


 PO


 SEVERE CONSITIPATION  1/31/18 13:45


     


 


 


 Bacitracin


  (Baciguent Oint)  APPLY TO


 FACIAL


 ABRASI...  UNSCH  PRN


 TOPICAL


 SEE LABEL COMMENTS  1/31/18 17:45


     


 


 


 Cyclobenzaprine


 HCl


  (Flexeril)  10 mg  Q8HR


 PO


   2/1/18 14:00


    2/3/18 05:43


 








 (Kalina Pang)





Medical Decision Making


MDM Remarks


A:  56 y/o FM s/p lumbar L4-5 transforaminal interbody fusion; L4-5 

decompressive laminectomy with discectomy and foraminotomy; L4-5 pedicle screw 

fixation; L4-5 interbody cage placement by Dr. Meyer on 2/1/18.


 (Kalina Pang)





Plan


Plan Remarks


doing well, ambulating with RW


surgical pain controlled


dc to rehab today


follow up with Dr. Meyer as scheduled


 


 (Kalina Pang)





Attending Statement


The exam, history, and the medical decision-making described in the above note 

were completed with the assistance of the mid-level provider. I reviewed and 

agree with the findings presented.  I attest that I had a face-to-face 

encounter with the patient on the same day, and personally performed and 

documented my assessment and findings in the medical record.


 (Nathan Cadet MD)











Kalina Pang Feb 3, 2018 12:27


Nathan Cadet MD Feb 3, 2018 12:54